# Patient Record
Sex: FEMALE | Race: WHITE
[De-identification: names, ages, dates, MRNs, and addresses within clinical notes are randomized per-mention and may not be internally consistent; named-entity substitution may affect disease eponyms.]

---

## 2017-07-20 ENCOUNTER — HOSPITAL ENCOUNTER (OUTPATIENT)
Dept: HOSPITAL 61 - PCVCCLINIC | Age: 77
Discharge: HOME | End: 2017-07-20
Attending: INTERNAL MEDICINE
Payer: MEDICARE

## 2017-07-20 DIAGNOSIS — Z88.8: ICD-10-CM

## 2017-07-20 DIAGNOSIS — M19.90: ICD-10-CM

## 2017-07-20 DIAGNOSIS — E03.9: ICD-10-CM

## 2017-07-20 DIAGNOSIS — Z79.82: ICD-10-CM

## 2017-07-20 DIAGNOSIS — Z86.73: ICD-10-CM

## 2017-07-20 DIAGNOSIS — E78.00: ICD-10-CM

## 2017-07-20 DIAGNOSIS — I10: ICD-10-CM

## 2017-07-20 DIAGNOSIS — Z88.2: ICD-10-CM

## 2017-07-20 DIAGNOSIS — I44.7: Primary | ICD-10-CM

## 2017-07-20 DIAGNOSIS — I77.89: ICD-10-CM

## 2017-07-20 PROCEDURE — G0463 HOSPITAL OUTPT CLINIC VISIT: HCPCS

## 2017-07-20 PROCEDURE — 80061 LIPID PANEL: CPT

## 2017-07-20 PROCEDURE — 93005 ELECTROCARDIOGRAM TRACING: CPT

## 2018-04-19 ENCOUNTER — HOSPITAL ENCOUNTER (OUTPATIENT)
Dept: HOSPITAL 61 - PCVCIMAG | Age: 78
Discharge: HOME | End: 2018-04-19
Attending: INTERNAL MEDICINE
Payer: MEDICARE

## 2018-04-19 DIAGNOSIS — I10: ICD-10-CM

## 2018-04-19 DIAGNOSIS — I44.7: ICD-10-CM

## 2018-04-19 DIAGNOSIS — E78.00: ICD-10-CM

## 2018-04-19 DIAGNOSIS — Z86.73: ICD-10-CM

## 2018-04-19 DIAGNOSIS — Z79.899: ICD-10-CM

## 2018-04-19 DIAGNOSIS — I65.23: Primary | ICD-10-CM

## 2018-04-19 DIAGNOSIS — R94.31: ICD-10-CM

## 2018-04-19 DIAGNOSIS — I77.9: ICD-10-CM

## 2018-04-19 DIAGNOSIS — Z79.82: ICD-10-CM

## 2018-04-19 PROCEDURE — 80061 LIPID PANEL: CPT

## 2018-04-19 PROCEDURE — 93005 ELECTROCARDIOGRAM TRACING: CPT

## 2018-04-19 PROCEDURE — 93880 EXTRACRANIAL BILAT STUDY: CPT

## 2019-04-24 ENCOUNTER — HOSPITAL ENCOUNTER (OUTPATIENT)
Dept: HOSPITAL 61 - PCVCIMAG | Age: 79
Discharge: HOME | End: 2019-04-24
Attending: INTERNAL MEDICINE
Payer: MEDICARE

## 2019-04-24 DIAGNOSIS — Z86.73: ICD-10-CM

## 2019-04-24 DIAGNOSIS — E03.9: ICD-10-CM

## 2019-04-24 DIAGNOSIS — I65.23: Primary | ICD-10-CM

## 2019-04-24 DIAGNOSIS — Z88.8: ICD-10-CM

## 2019-04-24 DIAGNOSIS — E78.00: ICD-10-CM

## 2019-04-24 DIAGNOSIS — Z79.899: ICD-10-CM

## 2019-04-24 DIAGNOSIS — I25.10: ICD-10-CM

## 2019-04-24 DIAGNOSIS — I10: ICD-10-CM

## 2019-04-24 DIAGNOSIS — R94.31: ICD-10-CM

## 2019-04-24 DIAGNOSIS — I44.7: ICD-10-CM

## 2019-04-24 DIAGNOSIS — Z79.82: ICD-10-CM

## 2019-04-24 DIAGNOSIS — E78.5: ICD-10-CM

## 2019-04-24 PROCEDURE — G0463 HOSPITAL OUTPT CLINIC VISIT: HCPCS

## 2019-04-24 PROCEDURE — 93005 ELECTROCARDIOGRAM TRACING: CPT

## 2019-04-24 PROCEDURE — 93880 EXTRACRANIAL BILAT STUDY: CPT

## 2019-04-24 PROCEDURE — 80061 LIPID PANEL: CPT

## 2019-04-24 PROCEDURE — 36415 COLL VENOUS BLD VENIPUNCTURE: CPT

## 2019-04-24 NOTE — PCVCIMAG
EXAM: BILATERAL CAROTID DUPLEX



INDICATION: Carotid Occlusive Disease.



FINDINGS: 

Doppler Measurements (centimeters per second):



RIGHT:  Peak CCA-102, Peak ECA-153, Diastolic ICA-15, Peak ICA-89,

ICA/CCA Ratio-0.9.



LEFT:  Peak CCA-96, Peak ECA-135, Diastolic ICA-18, Peak ICA-118,

ICA/CCA Ratio-1.2.



RIGHT CAROTID: The carotid bulb has moderate plaque. The proximal

internal carotid artery shows <40% stenosis. The common carotid artery

shows no significant stenosis. The external carotid artery shows 50%

stenosis.



LEFT CAROTID:  The carotid bulb has moderate plaque. The proximal

internal carotid artery shows <40% stenosis. The common carotid artery

shows no significant stenosis. The external carotid artery shows 40%

stenosis.



Antegrade flow in both vertebral arteries.



IMPRESSION: 

<40% stenosis of the right internal carotid artery with moderate

plaque.

<40% stenosis of the left internal carotid artery with moderate

plaque.



LOC:[place] [id number]